# Patient Record
Sex: MALE | Race: BLACK OR AFRICAN AMERICAN | Employment: FULL TIME | URBAN - NONMETROPOLITAN AREA
[De-identification: names, ages, dates, MRNs, and addresses within clinical notes are randomized per-mention and may not be internally consistent; named-entity substitution may affect disease eponyms.]

---

## 2018-11-19 ENCOUNTER — OFFICE VISIT (OUTPATIENT)
Dept: NEUROLOGY | Age: 33
End: 2018-11-19
Payer: COMMERCIAL

## 2018-11-19 VITALS
OXYGEN SATURATION: 97 % | SYSTOLIC BLOOD PRESSURE: 134 MMHG | HEIGHT: 71 IN | BODY MASS INDEX: 44.1 KG/M2 | WEIGHT: 315 LBS | HEART RATE: 82 BPM | DIASTOLIC BLOOD PRESSURE: 81 MMHG

## 2018-11-19 DIAGNOSIS — E66.01 MORBID OBESITY (HCC): ICD-10-CM

## 2018-11-19 DIAGNOSIS — M54.2 NECK PAIN: ICD-10-CM

## 2018-11-19 DIAGNOSIS — R20.2 PARESTHESIA OF BOTH HANDS: Primary | ICD-10-CM

## 2018-11-19 PROCEDURE — 99204 OFFICE O/P NEW MOD 45 MIN: CPT | Performed by: PSYCHIATRY & NEUROLOGY

## 2018-11-19 RX ORDER — CYCLOBENZAPRINE HCL 10 MG
10 TABLET ORAL NIGHTLY
COMMUNITY
Start: 2018-10-18

## 2018-11-19 RX ORDER — ATENOLOL 50 MG/1
50 TABLET ORAL DAILY
COMMUNITY
Start: 2018-09-24

## 2018-11-19 RX ORDER — NAPROXEN 500 MG/1
500 TABLET ORAL DAILY PRN
COMMUNITY
Start: 2018-09-24
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
## 2018-12-11 ENCOUNTER — HOSPITAL ENCOUNTER (OUTPATIENT)
Dept: NEUROLOGY | Age: 33
Discharge: HOME | End: 2018-12-11
Payer: COMMERCIAL

## 2018-12-11 DIAGNOSIS — R20.2 PARESTHESIA OF BOTH HANDS: ICD-10-CM

## 2018-12-11 PROCEDURE — 95911 NRV CNDJ TEST 9-10 STUDIES: CPT | Performed by: PSYCHIATRY & NEUROLOGY

## 2018-12-11 PROCEDURE — 95886 MUSC TEST DONE W/N TEST COMP: CPT

## 2018-12-11 PROCEDURE — 95911 NRV CNDJ TEST 9-10 STUDIES: CPT

## 2018-12-11 PROCEDURE — 95886 MUSC TEST DONE W/N TEST COMP: CPT | Performed by: PSYCHIATRY & NEUROLOGY

## 2019-01-07 ENCOUNTER — TELEPHONE (OUTPATIENT)
Dept: NEUROLOGY | Age: 34
End: 2019-01-07

## 2019-01-10 DIAGNOSIS — G56.03 BILATERAL CARPAL TUNNEL SYNDROME: Primary | ICD-10-CM

## 2019-01-18 ENCOUNTER — TRANSCRIBE ORDERS (OUTPATIENT)
Dept: ORTHOPEDIC SURGERY | Facility: CLINIC | Age: 34
End: 2019-01-18

## 2019-01-18 DIAGNOSIS — G56.03 BILATERAL CARPAL TUNNEL SYNDROME: Primary | ICD-10-CM

## 2019-01-22 DIAGNOSIS — M79.641 BILATERAL HAND PAIN: Primary | ICD-10-CM

## 2019-01-22 DIAGNOSIS — M79.642 BILATERAL HAND PAIN: Primary | ICD-10-CM

## 2019-01-23 ENCOUNTER — OFFICE VISIT (OUTPATIENT)
Dept: ORTHOPEDIC SURGERY | Facility: CLINIC | Age: 34
End: 2019-01-23

## 2019-01-23 VITALS — HEIGHT: 73 IN | BODY MASS INDEX: 41.75 KG/M2 | WEIGHT: 315 LBS

## 2019-01-23 DIAGNOSIS — G56.03 CARPAL TUNNEL SYNDROME, BILATERAL: ICD-10-CM

## 2019-01-23 DIAGNOSIS — M79.642 BILATERAL HAND PAIN: Primary | ICD-10-CM

## 2019-01-23 DIAGNOSIS — M79.641 BILATERAL HAND PAIN: Primary | ICD-10-CM

## 2019-01-23 PROCEDURE — 99203 OFFICE O/P NEW LOW 30 MIN: CPT | Performed by: ORTHOPAEDIC SURGERY

## 2019-01-23 RX ORDER — BUPIVACAINE HCL/0.9 % NACL/PF 0.1 %
2 PLASTIC BAG, INJECTION (ML) EPIDURAL ONCE
Status: CANCELLED | OUTPATIENT
Start: 2019-02-14 | End: 2019-01-23

## 2019-02-05 ENCOUNTER — APPOINTMENT (OUTPATIENT)
Dept: PREADMISSION TESTING | Facility: HOSPITAL | Age: 34
End: 2019-02-05

## 2019-02-05 VITALS
RESPIRATION RATE: 20 BRPM | HEIGHT: 71 IN | BODY MASS INDEX: 44.1 KG/M2 | SYSTOLIC BLOOD PRESSURE: 141 MMHG | WEIGHT: 315 LBS | DIASTOLIC BLOOD PRESSURE: 75 MMHG | HEART RATE: 70 BPM | OXYGEN SATURATION: 97 %

## 2019-02-05 PROCEDURE — 93010 ELECTROCARDIOGRAM REPORT: CPT | Performed by: INTERNAL MEDICINE

## 2019-02-05 PROCEDURE — 93005 ELECTROCARDIOGRAM TRACING: CPT

## 2019-02-05 RX ORDER — SODIUM CHLORIDE, SODIUM GLUCONATE, SODIUM ACETATE, POTASSIUM CHLORIDE, AND MAGNESIUM CHLORIDE 526; 502; 368; 37; 30 MG/100ML; MG/100ML; MG/100ML; MG/100ML; MG/100ML
1000 INJECTION, SOLUTION INTRAVENOUS CONTINUOUS
Status: CANCELLED | OUTPATIENT
Start: 2019-02-14

## 2019-02-05 RX ORDER — ATENOLOL 50 MG/1
50 TABLET ORAL DAILY
COMMUNITY

## 2019-02-05 NOTE — DISCHARGE INSTRUCTIONS
[facility]  Pre-op Information and Guidelines    You will be called after 2 p.m. the day before your surgery (Friday for Monday surgery) and notified of your time for arrival and approximate surgery time.  If you have not received a call by 4P.M., please contact Same Day Surgery at [phone] of if outside [facility] call [phone].    Please Follow these Important Safety Guidelines:    • The morning of your procedure, take only the medications listed below with   A sip of water:_____________________________________________       _________ATENOLOL__________________________    • DO NOT eat or drink anything after 12:00 midnight the night before surgery  Specific instructions concerning drinking clear liquids will be discussed during  the pre-surgery instruction call the day before your surgery.    • If you take a blood thinner (ex. Plavix, Coumadin, aspirin), ask your doctor when to stop it before surgery  STOP DATE: _________________    • Only 2 visitors are allowed in patient rooms at a time  Your visitors will be asked to wait in the lobby until the admission process is complete with the exception of a parent with a child and patients in need of special assistance.    • YOU CANNOT DRIVE YOURSELF HOME  You must be accompanied by someone who will be responsible for driving you home after surgery and for your care at home.    • DO NOT chew gum, use breath mints, hard candy, or smoke the day of surgery  • DO NOT drink alcohol for at least 24 hours before your surgery  • DO NOT wear any jewelry and remove all body piercing before coming to the hospital  • DO NOT wear make-up to the hospital  • If you are having surgery on an extremity (arm/leg/foot) remove nail polish/artificial nails on the surgical side  • Clothing, glasses, contacts, dentures, and hairpieces must be removed before surgery  • Bathe the night before or the morning of your surgery and do not use powders/lotions on  skin.

## 2019-02-13 ENCOUNTER — ANESTHESIA EVENT (OUTPATIENT)
Dept: PERIOP | Facility: HOSPITAL | Age: 34
End: 2019-02-13

## 2019-02-14 ENCOUNTER — ANESTHESIA (OUTPATIENT)
Dept: PERIOP | Facility: HOSPITAL | Age: 34
End: 2019-02-14

## 2019-02-14 ENCOUNTER — HOSPITAL ENCOUNTER (OUTPATIENT)
Facility: HOSPITAL | Age: 34
Setting detail: HOSPITAL OUTPATIENT SURGERY
Discharge: HOME | End: 2019-02-14
Attending: ORTHOPAEDIC SURGERY | Admitting: ORTHOPAEDIC SURGERY

## 2019-02-14 VITALS
DIASTOLIC BLOOD PRESSURE: 69 MMHG | TEMPERATURE: 98.1 F | OXYGEN SATURATION: 99 % | WEIGHT: 315 LBS | SYSTOLIC BLOOD PRESSURE: 123 MMHG | RESPIRATION RATE: 18 BRPM | BODY MASS INDEX: 44.1 KG/M2 | HEIGHT: 71 IN | HEART RATE: 63 BPM

## 2019-02-14 DIAGNOSIS — G56.03 CARPAL TUNNEL SYNDROME, BILATERAL: ICD-10-CM

## 2019-02-14 PROCEDURE — 64721 CARPAL TUNNEL SURGERY: CPT | Performed by: ORTHOPAEDIC SURGERY

## 2019-02-14 RX ORDER — 0.9 % SODIUM CHLORIDE 0.9 %
VIAL (ML) INJECTION AS NEEDED
Status: DISCONTINUED | OUTPATIENT
Start: 2019-02-14 | End: 2019-02-14 | Stop reason: HOSPADM

## 2019-02-14 RX ORDER — BUPIVACAINE HCL/0.9 % NACL/PF 0.1 %
2 PLASTIC BAG, INJECTION (ML) EPIDURAL ONCE
Status: COMPLETED | OUTPATIENT
Start: 2019-02-14 | End: 2019-02-14

## 2019-02-14 RX ORDER — BACITRACIN 50000 [IU]/1
INJECTION, POWDER, FOR SOLUTION INTRAMUSCULAR AS NEEDED
Status: DISCONTINUED | OUTPATIENT
Start: 2019-02-14 | End: 2019-02-14 | Stop reason: HOSPADM

## 2019-02-14 RX ORDER — SODIUM CHLORIDE, SODIUM GLUCONATE, SODIUM ACETATE, POTASSIUM CHLORIDE, AND MAGNESIUM CHLORIDE 526; 502; 368; 37; 30 MG/100ML; MG/100ML; MG/100ML; MG/100ML; MG/100ML
1000 INJECTION, SOLUTION INTRAVENOUS CONTINUOUS
Status: DISCONTINUED | OUTPATIENT
Start: 2019-02-14 | End: 2019-02-14 | Stop reason: HOSPADM

## 2019-02-14 RX ORDER — LIDOCAINE HYDROCHLORIDE 10 MG/ML
INJECTION, SOLUTION INFILTRATION; PERINEURAL AS NEEDED
Status: DISCONTINUED | OUTPATIENT
Start: 2019-02-14 | End: 2019-02-14 | Stop reason: HOSPADM

## 2019-02-14 RX ORDER — HYDROCODONE BITARTRATE AND ACETAMINOPHEN 7.5; 325 MG/1; MG/1
1-2 TABLET ORAL EVERY 4 HOURS PRN
Qty: 30 TABLET | Refills: 0 | Status: SHIPPED | OUTPATIENT
Start: 2019-02-14 | End: 2019-02-28

## 2019-02-14 RX ADMIN — Medication 2 G: at 12:48

## 2019-02-14 RX ADMIN — SODIUM CHLORIDE, SODIUM GLUCONATE, SODIUM ACETATE, POTASSIUM CHLORIDE, AND MAGNESIUM CHLORIDE 1000 ML: 526; 502; 368; 37; 30 INJECTION, SOLUTION INTRAVENOUS at 11:18

## 2019-02-14 NOTE — ANESTHESIA POSTPROCEDURE EVALUATION
Patient: Bautista Lugo    Procedure Summary     Date:  02/14/19 Room / Location:  [facility] OR 03 / [facility]    Anesthesia Start:  1242 Anesthesia Stop:  1319    Procedure:  CARPAL TUNNEL RELEASE (Right Wrist) Diagnosis:       Carpal tunnel syndrome, bilateral      (Carpal tunnel syndrome, bilateral [G56.03])    Surgeon:  Beau Delacruz MD Provider:  Rj Okeefe MD    Anesthesia Type:  other ASA Status:  4          Anesthesia Type: other  Last vitals  BP   133/69 (02/14/19 1109)   Temp   97.2 °F (36.2 °C) (02/14/19 1109)   Pulse   63 (02/14/19 1109)   Resp   18 (02/14/19 1109)     SpO2   99 % (02/14/19 1109)     Post Anesthesia Care and Evaluation    Patient location during evaluation: bedside  Patient participation: complete - patient participated  Level of consciousness: awake and alert  Pain score: 0  Pain management: adequate  Airway patency: patent  Anesthetic complications: No anesthetic complications  PONV Status: none  Cardiovascular status: acceptable  Respiratory status: acceptable  Hydration status: acceptable

## 2019-02-14 NOTE — OP NOTE
Procedure(s):  CARPAL TUNNEL RELEASE  Procedure Note    Bautista Lugo  2/14/2019    Pre-op Diagnosis:   Carpal tunnel syndrome, bilateral [G56.03]    Post-op Diagnosis:     Post-Op Diagnosis Codes:     * Carpal tunnel syndrome, bilateral [G56.03]    Procedure/CPT® Codes:      Procedure(s):  CARPAL TUNNEL RELEASE right    Surgeon(s):  Beau Delacruz MD    Anesthesia: Local    Staff:   Circulator: Kaylin Acosta RN  Scrub Person: Ivy Walls  Assistant: Saniya Lynch MA    Estimated Blood Loss: minimal    Specimens:                None      Drains:      Findings:  carpal tunnel     Complications: None    Dictation: Indications: 32yo male with CTS diagnosed electro-diagnostically and clinically.  He is failed conservative treatment.  He is for release at this point risk benefits include but not limited to bleeding, infection, blood clot, failure to resolve his pain, neurovascular injury, need for further surgery, prolonged recovery, skin issues, medical anesthetic complications, etc. he understands detailed informed consent is given will good proceed as planned.    Procedure:  Procedure: Patient taken operating room the arm was prepped and draped usual sterile fashion timeout performed prior to procedure.    Area of the volar surface of the carpal canal was infiltrated 1% plain Xylocaine.  It was tested for numbness.  Tourniquet was inflated.  Sharp dissection carried to the skin down to the transverse carpal ligament with care to avoid neurovascular structures.  Transverse carpal ligament was identified and entered sharply in line with the fourth metacarpal ray.  A elevator was inserted to protect the canal contents.  This transverse carpal ligament was divided sharply in line with the fourth metacarpal ray.  Careful scissor dissection was carried proximally and distally to make sure this had been released adequately.  Nerve was somewhat erythematous the canal was palpated and noted to be free.   Tourniquet let down and bleeding points controlled with electrocautery.  Areas irrigated with copious amounts of saline solution.  Skin was enclosed 4-0 nylon interrupted fashion sterile dressings and a splint were applied she tolerated procedure well WERE correct    Beau Delacruz MD  02/14/19  1:59 PM

## 2019-02-14 NOTE — ANESTHESIA PREPROCEDURE EVALUATION
Anesthesia Evaluation     Patient summary reviewed and Nursing notes reviewed   no history of anesthetic complications:  NPO Solid Status: > 8 hours  NPO Liquid Status: > 8 hours           Airway   Mallampati: I  TM distance: >3 FB  Neck ROM: full  possible difficult intubation  Dental    (+) poor dentation    Pulmonary - normal exam    breath sounds clear to auscultation  (+) a smoker Former, sleep apnea on CPAP, decreased breath sounds,   Cardiovascular - normal exam    ECG reviewed  Patient on routine beta blocker and Beta blocker given within 24 hours of surgery  Rhythm: regular  Rate: normal    (+) hypertension,   (-) murmur    ROS comment: Normal sinus rhythm  Normal ECG  No previous ECGs available  Confirmed by WILLARD SHEPARD, B. N. ([id number]) on 2/6/2019 9:17:54 PM    Neuro/Psych  (+) numbness (Bilateral carpal tunnel syndrome.),     GI/Hepatic/Renal/Endo    (+) morbid obesity,      Musculoskeletal (-) negative ROS    Abdominal   (+) obese,    Substance History - negative use     OB/GYN negative ob/gyn ROS         Other - negative ROS                       Anesthesia Plan    ASA 4     other     Anesthetic plan, all risks, benefits, and alternatives have been provided, discussed and informed consent has been obtained with: patient and spouse/significant other.

## 2019-02-14 NOTE — INTERVAL H&P NOTE
H&P updated. The patient was examined and the following changes are noted:  Risks and benefits of been explained as above.  We are doing the right side carpal canal today.     Temp:  [97.2 °F (36.2 °C)] 97.2 °F (36.2 °C)  Heart Rate:  [63] 63  Resp:  [18] 18  BP: (133)/(69) 133/69    No Known Allergies    Prior to Admission medications    Medication Sig Start Date End Date Taking? Authorizing Provider   atenolol (TENORMIN) 50 MG tablet Take 50 mg by mouth Daily.   Yes Provider, MD Moon       Past Medical History:   Diagnosis Date   • Carpal tunnel syndrome    • Hypertension    • Sleep apnea     using bipap       Past Surgical History:   Procedure Laterality Date   • HERNIA REPAIR         Social History     Socioeconomic History   • Marital status: Single     Spouse name: Not on file   • Number of children: Not on file   • Years of education: Not on file   • Highest education level: Not on file   Social Needs   • Financial resource strain: Not on file   • Food insecurity - worry: Not on file   • Food insecurity - inability: Not on file   • Transportation needs - medical: Not on file   • Transportation needs - non-medical: Not on file   Occupational History   • Not on file   Tobacco Use   • Smoking status: Former Smoker     Years: 6.00     Last attempt to quit: 2019     Years since quittin.1   • Smokeless tobacco: Never Used   Substance and Sexual Activity   • Alcohol use: Yes     Comment: occasionally   • Drug use: No   • Sexual activity: Defer   Other Topics Concern   • Not on file   Social History Narrative   • Not on file       History reviewed. No pertinent family history.      Carpal tunnel syndrome, bilateral

## 2019-02-21 ENCOUNTER — OFFICE VISIT (OUTPATIENT)
Dept: ORTHOPEDIC SURGERY | Facility: CLINIC | Age: 34
End: 2019-02-21

## 2019-02-21 VITALS — WEIGHT: 315 LBS | BODY MASS INDEX: 44.1 KG/M2 | HEIGHT: 71 IN

## 2019-02-21 DIAGNOSIS — M79.641 BILATERAL HAND PAIN: Primary | ICD-10-CM

## 2019-02-21 DIAGNOSIS — M79.642 BILATERAL HAND PAIN: Primary | ICD-10-CM

## 2019-02-21 DIAGNOSIS — G56.03 CARPAL TUNNEL SYNDROME, BILATERAL: ICD-10-CM

## 2019-02-21 PROCEDURE — 99024 POSTOP FOLLOW-UP VISIT: CPT | Performed by: ORTHOPAEDIC SURGERY

## 2019-02-21 NOTE — PROGRESS NOTES
Bautista Lugo is a 33 y.o. male is s/p       Chief Complaint   Patient presents with   • Right Wrist - Post-op   • Suture / Staple Removal       HISTORY OF PRESENT ILLNESS:   POST Op  Right carpal tunnel release  2/14/19      No Known Allergies      Current Outpatient Medications:   •  atenolol (TENORMIN) 50 MG tablet, Take 50 mg by mouth Daily., Disp: , Rfl:   •  HYDROcodone-acetaminophen (NORCO) 7.5-325 MG per tablet, Take 1-2 tablets by mouth Every 4 (Four) Hours As Needed for Moderate Pain  (Pain)., Disp: 30 tablet, Rfl: 0    No fevers or chills.  No nausea or vomiting.      PHYSICAL EXAMINATION:       Bautista Lugo is a 33 y.o. male    Patient is awake and alert, answers questions appropriately and is in no apparent distress.    GAIT:     [x]  Normal  []  Antalgic    Assistive device: [x]  None  []  Walker     []  Crutches  []  Cane     []  Wheelchair  []  Stretcher    Ortho Exam  Wound looks good.  Moving his hand well.  Numbness is improved.    Xr Hand 3+ View Bilateral    Result Date: 1/24/2019  Narrative: 3 views bilateral hand Comparison none 3 views are seen no acute features are noted.  Bone mineralization is intact.  Joint space is well-maintained Person: Negative bilateral hand series           ASSESSMENT:    Diagnoses and all orders for this visit:    Bilateral hand pain    Carpal tunnel syndrome, bilateral          PLAN splint removal range of motion.  Follow-up 1 week for suture removal.    No Follow-up on file.    Beau Delacruz MD

## 2019-02-28 ENCOUNTER — OFFICE VISIT (OUTPATIENT)
Dept: ORTHOPEDIC SURGERY | Facility: CLINIC | Age: 34
End: 2019-02-28

## 2019-02-28 VITALS — HEIGHT: 71 IN | WEIGHT: 315 LBS | BODY MASS INDEX: 44.1 KG/M2

## 2019-02-28 DIAGNOSIS — Z98.890 S/P CARPAL TUNNEL RELEASE: Primary | ICD-10-CM

## 2019-02-28 PROCEDURE — 99024 POSTOP FOLLOW-UP VISIT: CPT | Performed by: ORTHOPAEDIC SURGERY

## 2019-02-28 RX ORDER — BUPIVACAINE HCL/0.9 % NACL/PF 0.1 %
2 PLASTIC BAG, INJECTION (ML) EPIDURAL ONCE
Status: CANCELLED | OUTPATIENT
Start: 2019-03-08 | End: 2019-02-28

## 2019-02-28 NOTE — PROGRESS NOTES
Bautista Luog is a 33 y.o. male is s/p  Right Carpal Tunnel Release.     Chief Complaint   Patient presents with   • Right Hand - Follow-up       HISTORY OF PRESENT ILLNESS: Patient is here today for recheck of right hand. Patient had right CTR on 2/14/2019. Patient states that his pain is 2/10 every now and then.        No Known Allergies      Current Outpatient Medications:   •  atenolol (TENORMIN) 50 MG tablet, Take 50 mg by mouth Daily., Disp: , Rfl:         PHYSICAL EXAMINATION:       Bautista Lugo is a 33 y.o. male    Patient is awake and alert, answers questions appropriately and is in no apparent distress.    GAIT:     [x]  Normal  []  Antalgic    Assistive device: []  None  []  Walker     []  Crutches  []  Cane     []  Wheelchair  []  Stretcher    Ortho Exam  Wound looks good.  No sign of infection.  He is able to make a fist well.    No results found.        ASSESSMENT:    Diagnoses and all orders for this visit:    S/P carpal tunnel release          PLAN doing very well at this point.  We will take the stitches out scar massage.  He wants to go ahead and get his left hand done we will get tomorrow to do this.  The risks and benefits of the same on that side for he is still symptomatic.  These include but not limited to bleeding, blood clot, infection, need for further surgery, neurovascular injury, infection, risk of wound problems, medical anesthetic problems etc. he understands we will proceed as planned with the left side carpal tunnel release                This document has been electronically signed by Saniya Lynch MA on February 28, 2019 8:13 AM

## 2019-03-05 ENCOUNTER — APPOINTMENT (OUTPATIENT)
Dept: PREADMISSION TESTING | Facility: HOSPITAL | Age: 34
End: 2019-03-05

## 2019-03-05 RX ORDER — SODIUM CHLORIDE, SODIUM GLUCONATE, SODIUM ACETATE, POTASSIUM CHLORIDE, AND MAGNESIUM CHLORIDE 526; 502; 368; 37; 30 MG/100ML; MG/100ML; MG/100ML; MG/100ML; MG/100ML
1000 INJECTION, SOLUTION INTRAVENOUS CONTINUOUS
Status: CANCELLED | OUTPATIENT
Start: 2019-03-08

## 2019-03-07 ENCOUNTER — ANESTHESIA EVENT (OUTPATIENT)
Dept: PERIOP | Facility: HOSPITAL | Age: 34
End: 2019-03-07

## 2019-03-08 ENCOUNTER — ANESTHESIA (OUTPATIENT)
Dept: PERIOP | Facility: HOSPITAL | Age: 34
End: 2019-03-08

## 2019-03-08 ENCOUNTER — HOSPITAL ENCOUNTER (OUTPATIENT)
Facility: HOSPITAL | Age: 34
Setting detail: HOSPITAL OUTPATIENT SURGERY
Discharge: HOME | End: 2019-03-08
Attending: ORTHOPAEDIC SURGERY | Admitting: ORTHOPAEDIC SURGERY

## 2019-03-08 VITALS
HEIGHT: 71 IN | OXYGEN SATURATION: 96 % | RESPIRATION RATE: 20 BRPM | WEIGHT: 315 LBS | TEMPERATURE: 97.2 F | SYSTOLIC BLOOD PRESSURE: 113 MMHG | BODY MASS INDEX: 44.1 KG/M2 | HEART RATE: 65 BPM | DIASTOLIC BLOOD PRESSURE: 63 MMHG

## 2019-03-08 DIAGNOSIS — Z98.890 S/P CARPAL TUNNEL RELEASE: ICD-10-CM

## 2019-03-08 PROCEDURE — 64721 CARPAL TUNNEL SURGERY: CPT | Performed by: ORTHOPAEDIC SURGERY

## 2019-03-08 RX ORDER — LIDOCAINE HYDROCHLORIDE 10 MG/ML
INJECTION, SOLUTION INFILTRATION; PERINEURAL AS NEEDED
Status: DISCONTINUED | OUTPATIENT
Start: 2019-03-08 | End: 2019-03-08 | Stop reason: HOSPADM

## 2019-03-08 RX ORDER — BACITRACIN 50000 [IU]/1
INJECTION, POWDER, FOR SOLUTION INTRAMUSCULAR AS NEEDED
Status: DISCONTINUED | OUTPATIENT
Start: 2019-03-08 | End: 2019-03-08 | Stop reason: HOSPADM

## 2019-03-08 RX ORDER — BUPIVACAINE HCL/0.9 % NACL/PF 0.1 %
2 PLASTIC BAG, INJECTION (ML) EPIDURAL ONCE
Status: COMPLETED | OUTPATIENT
Start: 2019-03-08 | End: 2019-03-08

## 2019-03-08 RX ORDER — SODIUM CHLORIDE, SODIUM GLUCONATE, SODIUM ACETATE, POTASSIUM CHLORIDE, AND MAGNESIUM CHLORIDE 526; 502; 368; 37; 30 MG/100ML; MG/100ML; MG/100ML; MG/100ML; MG/100ML
1000 INJECTION, SOLUTION INTRAVENOUS CONTINUOUS
Status: DISCONTINUED | OUTPATIENT
Start: 2019-03-08 | End: 2019-03-08 | Stop reason: HOSPADM

## 2019-03-08 RX ORDER — 0.9 % SODIUM CHLORIDE 0.9 %
VIAL (ML) INJECTION AS NEEDED
Status: DISCONTINUED | OUTPATIENT
Start: 2019-03-08 | End: 2019-03-08 | Stop reason: HOSPADM

## 2019-03-08 RX ORDER — HYDROCODONE BITARTRATE AND ACETAMINOPHEN 7.5; 325 MG/1; MG/1
1-2 TABLET ORAL EVERY 4 HOURS PRN
Qty: 30 TABLET | Refills: 0 | Status: SHIPPED | OUTPATIENT
Start: 2019-03-08 | End: 2019-04-04

## 2019-03-08 RX ORDER — MAGNESIUM HYDROXIDE 1200 MG/15ML
LIQUID ORAL AS NEEDED
Status: DISCONTINUED | OUTPATIENT
Start: 2019-03-08 | End: 2019-03-08 | Stop reason: HOSPADM

## 2019-03-08 RX ADMIN — SODIUM CHLORIDE, SODIUM GLUCONATE, SODIUM ACETATE, POTASSIUM CHLORIDE, AND MAGNESIUM CHLORIDE 1000 ML: 526; 502; 368; 37; 30 INJECTION, SOLUTION INTRAVENOUS at 06:30

## 2019-03-08 RX ADMIN — Medication 2 G: at 07:43

## 2019-03-08 NOTE — INTERVAL H&P NOTE
H&P updated. The patient was examined and the following changes are noted:  He is for left carpal tunnel release at this point.  And benefits of been explained to him somewhat to the side including but not limited to bleeding, blood clot, infection, neurovascular injury, need for further surgery, failure to resolve his pain, medical anesthetic problems, also his review of systems is updated as of today's date except for numbness, weakness, loss of motion all other systems are negative     Temp:  [97.4 °F (36.3 °C)] 97.4 °F (36.3 °C)  Heart Rate:  [65] 65  Resp:  [18] 18  BP: (128)/(70) 128/70    No Known Allergies    Prior to Admission medications    Medication Sig Start Date End Date Taking? Authorizing Provider   atenolol (TENORMIN) 50 MG tablet Take 50 mg by mouth Daily.   Yes Provider, MD Moon       Past Medical History:   Diagnosis Date   • Carpal tunnel syndrome    • Hypertension    • Sleep apnea     using bipap       Past Surgical History:   Procedure Laterality Date   • CARPAL TUNNEL RELEASE Right 2019    Procedure: CARPAL TUNNEL RELEASE;  Surgeon: Beau Delacruz MD;  Location: [facility];  Service: Orthopedics   • HERNIA REPAIR         Social History     Socioeconomic History   • Marital status: Single     Spouse name: Not on file   • Number of children: Not on file   • Years of education: Not on file   • Highest education level: Not on file   Social Needs   • Financial resource strain: Not on file   • Food insecurity - worry: Not on file   • Food insecurity - inability: Not on file   • Transportation needs - medical: Not on file   • Transportation needs - non-medical: Not on file   Occupational History   • Not on file   Tobacco Use   • Smoking status: Former Smoker     Years: 6.00     Last attempt to quit: 2019     Years since quittin.1   • Smokeless tobacco: Never Used   Substance and Sexual Activity   • Alcohol use: Yes     Comment: occasionally   • Drug use: No   • Sexual  activity: Defer   Other Topics Concern   • Not on file   Social History Narrative   • Not on file       History reviewed. No pertinent family history.      S/P carpal tunnel release

## 2019-03-08 NOTE — ANESTHESIA POSTPROCEDURE EVALUATION
Patient: Bautista Lugo    Procedure Summary     Date:  03/08/19 Room / Location:  [facility] OR  / [facility] OR    Anesthesia Start:  0740 Anesthesia Stop:  0813    Procedure:  CARPAL TUNNEL RELEASE (Left Wrist) Diagnosis:       S/P carpal tunnel release      (S/P carpal tunnel release [Z98.890])    Surgeon:  Beau Delacruz MD Provider:  Mariano Yoon MD    Anesthesia Type:  other, MAC ASA Status:  4          Anesthesia Type: other, MAC  Last vitals  BP   128/70 (03/08/19 0622)   Temp   97.4 °F (36.3 °C) (03/08/19 0622)   Pulse   65 (03/08/19 0622)   Resp   18 (03/08/19 0622)     SpO2   97 % (03/08/19 0622)     Post Anesthesia Care and Evaluation    Patient location during evaluation: bedside  Patient participation: complete - patient participated  Level of consciousness: awake and alert  Pain score: 0  Pain management: adequate  Airway patency: patent  Anesthetic complications: No anesthetic complications  PONV Status: none  Cardiovascular status: acceptable and hemodynamically stable  Respiratory status: acceptable and spontaneous ventilation  Hydration status: acceptable

## 2019-03-08 NOTE — PROGRESS NOTES
The second interval performed at 725 was done in error.  The first 1 done at 7:22 AM is correct.  There is no any epic system to addend or delete the second 1 to my knowledge.  Again the first interval H&P on Mr. Bautista Lugo started March 8 at 722 8 mm and filed at 7:23 AM is correct

## 2019-03-08 NOTE — ANESTHESIA PREPROCEDURE EVALUATION
Anesthesia Evaluation     Patient summary reviewed and Nursing notes reviewed   no history of anesthetic complications:  NPO Solid Status: > 8 hours  NPO Liquid Status: > 8 hours           Airway   Mallampati: I  TM distance: >3 FB  Neck ROM: full  possible difficult intubation  Dental    (+) poor dentation    Pulmonary - normal exam    breath sounds clear to auscultation  (+) a smoker Former, sleep apnea on CPAP, decreased breath sounds,   Cardiovascular - normal exam    ECG reviewed  Patient on routine beta blocker and Beta blocker given within 24 hours of surgery  Rhythm: regular  Rate: normal    (+) hypertension,   (-) murmur    ROS comment: Normal sinus rhythm  Normal ECG  No previous ECGs available  Confirmed by WILLARD SHEPARD, B. N. ([id number]) on 2/6/2019 9:17:54 PM    Neuro/Psych  (+) numbness (Bilateral carpal tunnel syndrome.),     GI/Hepatic/Renal/Endo    (+) morbid obesity,      Musculoskeletal (-) negative ROS    Abdominal   (+) obese,    Substance History - negative use     OB/GYN negative ob/gyn ROS         Other - negative ROS                         Anesthesia Plan    ASA 4     other and MAC   (Previous right carpal tunnel week ago with MAC/local. Desires the same today.)  intravenous induction   Anesthetic plan, all risks, benefits, and alternatives have been provided, discussed and informed consent has been obtained with: patient and spouse/significant other.

## 2019-03-08 NOTE — OP NOTE
Procedure(s):  CARPAL TUNNEL RELEASE  Procedure Note    Bautista Lugo  3/8/2019    Pre-op Diagnosis:   S/P carpal tunnel release [Z98.890]    Post-op Diagnosis:     Post-Op Diagnosis Codes:     * S/P carpal tunnel release [Z98.890]    Procedure/CPT® Codes:      Procedure(s):  CARPAL TUNNEL RELEASE left    Surgeon(s):  Beau Delacruz MD    Anesthesia: Local    Staff:   Circulator: Kaylin Acosta RN  Scrub Person: Radha Sevilla  Assistant: Saniya Lynch MA    Estimated Blood Loss: minimal    Specimens:                None      Drains:      Findings: Carpal tunnel     Complications: None    Dictation: Indications: 33-year-old status post carpal tunnel the opposite side.  He has symptoms bilaterally.  He is for release of the left side at this time is out of the right side done and was very pleased with his results.  He is for left side release at this point.  Risk and benefits explained to him in great detail as in the updated H&P is up today's date detailed informed consent is given.    Procedure:   Procedure: Patient taken operating room the arm was prepped and draped usual sterile fashion timeout performed prior to procedure.    Area of the volar surface of the carpal canal was infiltrated 1% plain Xylocaine.  It was tested for numbness.  Tourniquet was inflated.  Sharp dissection carried to the skin down to the transverse carpal ligament with care to avoid neurovascular structures.  Transverse carpal ligament was identified and entered sharply in line with the fourth metacarpal ray.  A elevator was inserted to protect the canal contents.  This transverse carpal ligament was divided sharply in line with the fourth metacarpal ray.  Careful scissor dissection was carried proximally and distally to make sure this had been released adequately.  Nerve was somewhat erythematous the canal was palpated and noted to be free.  Tourniquet let down and bleeding points controlled with electrocautery.   Areas irrigated with copious amounts of saline solution.  Skin was enclosed 4-0 nylon interrupted fashion sterile dressings and a splint were applied she tolerated procedure well WERE correct    Beau Delacruz MD  03/08/19  8:12 AM

## 2019-03-21 ENCOUNTER — OFFICE VISIT (OUTPATIENT)
Dept: ORTHOPEDIC SURGERY | Facility: CLINIC | Age: 34
End: 2019-03-21

## 2019-03-21 VITALS — BODY MASS INDEX: 44.1 KG/M2 | HEIGHT: 71 IN | WEIGHT: 315 LBS

## 2019-03-21 DIAGNOSIS — Z98.890 S/P CARPAL TUNNEL RELEASE: Primary | ICD-10-CM

## 2019-03-21 DIAGNOSIS — M79.642 BILATERAL HAND PAIN: ICD-10-CM

## 2019-03-21 DIAGNOSIS — G56.03 CARPAL TUNNEL SYNDROME, BILATERAL: ICD-10-CM

## 2019-03-21 DIAGNOSIS — M79.641 BILATERAL HAND PAIN: ICD-10-CM

## 2019-03-21 PROCEDURE — 99024 POSTOP FOLLOW-UP VISIT: CPT | Performed by: ORTHOPAEDIC SURGERY

## 2019-03-21 NOTE — PROGRESS NOTES
Bautista Lugo is a 33 y.o. male is s/p       Chief Complaint   Patient presents with   • Left Wrist - Post-op       HISTORY OF PRESENT ILLNESS: post op f/u on left wrist, suture removal,        No Known Allergies      Current Outpatient Medications:   •  atenolol (TENORMIN) 50 MG tablet, Take 50 mg by mouth Daily., Disp: , Rfl:   •  HYDROcodone-acetaminophen (NORCO) 7.5-325 MG per tablet, Take 1-2 tablets by mouth Every 4 (Four) Hours As Needed for Moderate Pain  (Pain)., Disp: 30 tablet, Rfl: 0        PHYSICAL EXAMINATION:       Bautista Lugo is a 33 y.o. male    Patient is awake and alert, answers questions appropriately and is in no apparent distress.    GAIT:     []  Normal  []  Antalgic    Assistive device: []  None  []  Walker     []  Crutches  []  Cane     []  Wheelchair  []  Stretcher    Ortho Exam  Wound looks good.  Good sensation.  Feels better no sign of infection  No results found.        ASSESSMENT:    Diagnoses and all orders for this visit:    S/P carpal tunnel release    Bilateral hand pain    Carpal tunnel syndrome, bilateral          PLAN suture removal, scar massage.  Doing very well with both sides.  He will call with any problems we will see him back as needed he has a good understanding of the aftercare for his carpal tunnel surgery.  Will call with any changes.                This document has been electronically signed by Beau Delacruz MD on March 21, 2019 9:56 AM

## 2019-03-26 ENCOUNTER — TELEPHONE (OUTPATIENT)
Dept: ORTHOPEDIC SURGERY | Facility: CLINIC | Age: 34
End: 2019-03-26

## 2019-03-26 NOTE — TELEPHONE ENCOUNTER
JULIANN Krishnamurthy CALLED AND WAS WONDERING IF HE NEEDED A FOLLOW UP TO SEE YOU..  HE WAS ALSO WONDERING WHEN YOU THOUGHT HE WOULD BE RELEASED...

## 2019-03-27 NOTE — TELEPHONE ENCOUNTER
Called patient to let him know that he can be released when he feels he is ready and to follow up as needed per Dr. Delacruz.

## 2019-04-04 ENCOUNTER — OFFICE VISIT (OUTPATIENT)
Dept: ORTHOPEDIC SURGERY | Facility: CLINIC | Age: 34
End: 2019-04-04

## 2019-04-04 VITALS — HEIGHT: 71 IN | WEIGHT: 315 LBS | BODY MASS INDEX: 44.1 KG/M2

## 2019-04-04 DIAGNOSIS — Z98.890 S/P CARPAL TUNNEL RELEASE: Primary | ICD-10-CM

## 2019-04-04 PROCEDURE — 99024 POSTOP FOLLOW-UP VISIT: CPT | Performed by: ORTHOPAEDIC SURGERY

## 2019-04-04 NOTE — PROGRESS NOTES
Bautista Lugo is a 33 y.o. male is s/p  Left Carpal Tunnel Release.     Chief Complaint   Patient presents with   • Follow-up     Left CTR       HISTORY OF PRESENT ILLNESS: Patient is here today for recheck of left CTR done on 3/8/2019. Patient states that he not having any issue with pain today.       No Known Allergies      Current Outpatient Medications:   •  atenolol (TENORMIN) 50 MG tablet, Take 50 mg by mouth Daily., Disp: , Rfl:         PHYSICAL EXAMINATION:       Bautista Lugo is a 33 y.o. male    Patient is awake and alert, answers questions appropriately and is in no apparent distress.    GAIT:     []  Normal  []  Antalgic    Assistive device: []  None  []  Walker     []  Crutches  []  Cane     []  Wheelchair  []  Stretcher    Ortho Exam  Wound looks good.  Neurologically intact.    No results found.        ASSESSMENT:    Diagnoses and all orders for this visit:    S/P carpal tunnel release          PLAN doing well.  I let him go back to work.  He got some impact gloves.  We will see with any issues whatsoever                This document has been electronically signed by Saniya Lynch MA on April 4, 2019 8:02 AM

## 2021-05-10 ENCOUNTER — TRANSCRIBE ORDERS (OUTPATIENT)
Dept: PHYSICAL THERAPY | Facility: CLINIC | Age: 36
End: 2021-05-10

## 2021-05-10 DIAGNOSIS — M70.21 OLECRANON BURSITIS OF RIGHT ELBOW: Primary | ICD-10-CM

## 2021-05-11 ENCOUNTER — TREATMENT (OUTPATIENT)
Dept: PHYSICAL THERAPY | Facility: CLINIC | Age: 36
End: 2021-05-11

## 2021-05-11 DIAGNOSIS — M25.521 RIGHT ELBOW PAIN: ICD-10-CM

## 2021-05-11 DIAGNOSIS — M70.21 OLECRANON BURSITIS OF RIGHT ELBOW: Primary | ICD-10-CM

## 2021-05-11 PROCEDURE — 97162 PT EVAL MOD COMPLEX 30 MIN: CPT | Performed by: PHYSICAL THERAPIST

## 2021-05-11 PROCEDURE — 97110 THERAPEUTIC EXERCISES: CPT | Performed by: PHYSICAL THERAPIST

## 2021-05-11 NOTE — PROGRESS NOTES
"  Physical Therapy Initial Evaluation and Plan of Care      Patient: Bautista Lugo   : 1985  Diagnosis/ICD-10 Code:  Olecranon bursitis of right elbow [M70.21]  Referring practitioner: NOLVIA Rico  Date of Initial Visit: 2021  Today's Date: 2021  Patient seen for 1 sessions    Recertification: 2021   Next MD appt: Early .         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 He reports he was in a sling until he could go without it on his own. He reports he just stopped wearing the sling.      Patient Occupation: [employer]   Precautions and Work Restrictions: Off work for about a monthPain  Current pain ratin  At best pain ratin  At worst pain ratin  Location: R elbow  Quality: tight, squeezing, discomfort, needle-like and radiating  Relieving factors: change in position  Aggravating factors: movement and repetitive movement  Progression: improved    Social Support  Lives with: young children and spouse    Hand dominance: right    Diagnostic Tests  X-ray: abnormal (\"Fracture per patient\")    Treatments  Current treatment: medication  Patient Goals  Patient goals for therapy: decreased edema, decreased pain, increased motion, increased strength, independence with ADLs/IADLs, return to sport/leisure activities and return to work             Objective          Static Posture     Head  Forward.    Shoulders  Rounded.    Observations     Right Elbow   Positive for edema.       Tenderness     Right Elbow   Tenderness in the olecranon process.     Right Wrist/Hand   Tenderness in the olecranon process.     Neurological Testing     Sensation "     Elbow   Left Elbow   Intact: light touch    Right Elbow   Intact: light touch  Paresthesia: light touch    Active Range of Motion     Left Elbow   Normal active range of motion  Flexion: 134 degrees   Extension: 0 degrees   Forearm supination: Left elbow active supination: ~80-90°     Right Elbow   Flexion: 120 degrees   Extension: 12 degrees   Forearm supination: 40 degrees   Forearm pronation: WFL    Strength/Myotome Testing     Left Elbow   Normal strength    Right Elbow   Flexion: 5  Extension: 4+    Left Wrist/Hand   Normal wrist strength     (2nd hand position)    Trial 1: 80 lbs    Trial 2: 80 lbs    Trial 3: 89 lbs    Average: 83 lbs    Right Wrist/Hand   Wrist extension: 5  Wrist flexion: 4+  Radial deviation: 5  Ulnar deviation: 5     (2nd hand position)     Trial 1: 42 lbs    Trial 2: 40 lbs    Trial 3: 43 lbs    Average: 41.67 lbs    Tests     Right Elbow   Negative valgus stress at 0 degrees, valgus stress at 30 degrees, varus stress at 0 degrees and varus stress at 30 degrees.     Ambulation     Comments   FWB, non-antalgic gait, no distress, no assistive device, no significant gait deviation, normal arm swing with gait.          Assessment & Plan     Assessment  Impairments: abnormal or restricted ROM, activity intolerance, impaired physical strength, lacks appropriate home exercise program and pain with function  Assessment details: Patient comes in s/p fall on R elbow with bursitis and patient reported fracture. Patient has some mild decrease in ROM and strength. He has localized edme and mild tenderness as well as some ulnar nerve tension and irritation. Patient did well with all HEP exercises today. Patient was given written copies of HEP.  Prognosis: fair  Prognosis details: Barriers to Rehab: Include significant or possible arthritic/degenerative changes that have occurred within the joint, The patient's obesity.    Safety Issues: None noted.    Functional Limitations: carrying  objects, pulling, pushing, uncomfortable because of pain and unable to perform repetitive tasks  Goals  Plan Goals: Short Term Goals:  1) I with HEP and have additions/changes by next re-certification.    2) AROM R elbow flexion>=130°.    3) AROM R elbow extension>=5°.    4) R wrist MMT flexion 5/5.    5) R  at the #2 setting >= 60#.    6) Patient to have supination for the R forearm >= 80°.      Long Term goals:  1) AROM for the R elbow 0->= 130°. no increase in pain.    2) B wrist 5/5, no increase in pain.    3) R tricep 5/5.    4) R  at the #2 setting >= 80#.    6) Patient able to perform 1 arm box carry of 20# for 3 minutes with no increase in pain or onset of s/s.    7) Patient able to perform pushing/pulling activities with no increase in pain or onset of s/s.    8) I with final HEP        Plan  Therapy options: will be seen for skilled physical therapy services  Planned modality interventions: cryotherapy and ultrasound  Planned therapy interventions: body mechanics training, fine motor coordination training, flexibility, functional ROM exercises, home exercise program, manual therapy, soft tissue mobilization, strengthening, stretching and therapeutic activities  Duration in visits: 12  Treatment plan discussed with: patient  Plan details: Progress overall ROM, strength, , desensitization, activity tolerance, and work specific activities.      Other therapeutic activities and/or exercises will be prescribed depending on the patients progress or lack there of.     Visit Diagnoses:    ICD-10-CM ICD-9-CM   1. Olecranon bursitis of right elbow  M70.21 726.33   2. Right elbow pain  M25.521 719.42       Timed:  Manual Therapy:         mins  02922;  Therapeutic Exercise:    25     mins  14849;      Neuromuscular Juan Miguel:        mins  04529;    Therapeutic Activity:          mins  22847;     Gait Training:           mins  50131;     Ultrasound:          mins  83765;    Paraffin:        mins   45274;  Electrical Stimulation:         mins  24105 ( );    Untimed:  Electrical Stimulation:         mins  40764 ( );  Mechanical Traction:         mins  78149;     Timed Treatment:   25   mins   Total Treatment:     60   mins    PT SIGNATURE: Rosa Bustos PT DPT, CSCS   DATE TREATMENT INITIATED: 5/11/2021    Initial Certification  Certification Period: 8/9/2021  I certify that the therapy services are furnished while this patient is under my care.  The services outlined above are required by this patient, and will be reviewed every 90 days.     PHYSICIAN: Perry Costello MD PhD      DATE:     Please sign and return via fax to  .. Thank you, [facility].        This document has been electronically signed by NICOLE EspañaT, CSCS on May 11, 2021 09:07 CDT

## 2021-05-13 ENCOUNTER — TREATMENT (OUTPATIENT)
Dept: PHYSICAL THERAPY | Facility: CLINIC | Age: 36
End: 2021-05-13

## 2021-05-13 DIAGNOSIS — M70.21 OLECRANON BURSITIS OF RIGHT ELBOW: Primary | ICD-10-CM

## 2021-05-13 DIAGNOSIS — M25.521 RIGHT ELBOW PAIN: ICD-10-CM

## 2021-05-13 PROCEDURE — 97110 THERAPEUTIC EXERCISES: CPT | Performed by: PHYSICAL THERAPIST

## 2021-05-13 NOTE — PROGRESS NOTES
Physical Therapy Daily Progress Note    Patient: Bautista Lugo   : 1985  Diagnosis/ICD-10 Code:     Diagnosis Plan   1. Olecranon bursitis of right elbow     2. Right elbow pain       Referring practitioner: NOLVIA Rico  Date of Initial Visit: Type: THERAPY  Noted: 2021  Today's Date: 2021  Patient seen for 2 sessions      PT Recheck Due: 2021  PT MD Visit: Early 2021       Bautista Lugo        Subjective Evaluation    History of Present Illness    Subjective comment: states that pain is about the same. about a 6-7/10. reall just depends on what he's doing.          Objective          Active Range of Motion     Right Elbow   Flexion: 123 degrees   Extension: 5 degrees       See Exercise, Manual, and Modality Logs for complete treatment.       Assessment & Plan     Assessment  Assessment details: Patient has improved elbow extension this date. Gives good effort throughout. No increase in complaints are verbalized.     Goals  Plan Goals: Short Term Goals:  1) I with HEP and have additions/changes by next re-certification. (met)    2) AROM R elbow flexion>=130°.    3) AROM R elbow extension>=5°.    4) R wrist MMT flexion 5/5.    5) R  at the #2 setting >= 60#.    6) Patient to have supination for the R forearm >= 80°.      Long Term goals:  1) AROM for the R elbow 0->= 130°. no increase in pain.    2) B wrist 5/5, no increase in pain.    3) R tricep 5/5.    4) R  at the #2 setting >= 80#.    6) Patient able to perform 1 arm box carry of 20# for 3 minutes with no increase in pain or onset of s/s.    7) Patient able to perform pushing/pulling activities with no increase in pain or onset of s/s.    8) I with final HEP    Plan  Plan details: Next visit tband wrist curls      Progress per Plan of Care and Progress strengthening /stabilization /functional activity            Timed:  Manual Therapy:         mins  79078;  Therapeutic Exercise:    45     mins  57518;    Aquatic Therex :        mins  64681    Neuromuscular Juan Miguel:        mins  55995;    Therapeutic Activity:          mins  07859;     Gait Training:           mins  18951;     Ultrasound:          mins  74306;    Electrical Stimulation:         mins  45210 ( );    Untimed:  Electrical Stimulation:         mins  17113 ( );  Mechanical Traction:         mins  08164;   Paraffin:         mins  86651;  Orthotic fit/train:         mins  38548  Iontophoresis:          mins  60739    Timed Treatment:   45   mins   Total Treatment:     45   mins  Ellie Feng PTA  Physical Therapist Assistant      This document has been electronically signed by Ellie Feng PTA on May 13, 2021 09:58 CDT

## 2021-05-17 ENCOUNTER — TREATMENT (OUTPATIENT)
Dept: PHYSICAL THERAPY | Facility: CLINIC | Age: 36
End: 2021-05-17

## 2021-05-17 DIAGNOSIS — M25.521 RIGHT ELBOW PAIN: ICD-10-CM

## 2021-05-17 DIAGNOSIS — M70.21 OLECRANON BURSITIS OF RIGHT ELBOW: Primary | ICD-10-CM

## 2021-05-17 PROCEDURE — 97110 THERAPEUTIC EXERCISES: CPT | Performed by: PHYSICAL THERAPIST

## 2021-05-17 NOTE — PROGRESS NOTES
Physical Therapy Daily Progress Note    Patient: Bautista Lugo   : 1985  Diagnosis/ICD-10 Code:     Diagnosis Plan   1. Olecranon bursitis of right elbow     2. Right elbow pain       Referring practitioner: NOLVIA Rico  Date of Initial Visit: Type: THERAPY  Noted: 2021  Today's Date: 2021  Patient seen for 3 sessions      PT Recheck Due: 2021  PT MD Visit: 2021       Bautista Lugo Subjective Evaluation    History of Present Illness    Subjective comment: Pt states his elbow feels about the same.  Pain is not terrible , but still painful.  States the elbow is less sensitive to the touch .  Reports pain to 5-6/10 at start of tx session. Pain  Current pain ratin             Objective   See Exercise, Manual, and Modality Logs for complete treatment.         Assessment & Plan     Assessment  Assessment details: Pt with good effort.  Does not report any increased pain with therex this date.  Pt had left putty in vehicle.  Reviewed use of putty for HEP and will bring next visit.  Pt issued resistance band and written handout for additions to HEP with verbal instruction.  Ice to go post tx.     Goals  Plan Goals: Short Term Goals:  1) I with HEP and have additions/changes by next re-certification. (met)    2) AROM R elbow flexion>=130°.    3) AROM R elbow extension>=5°.    4) R wrist MMT flexion 5/5.    5) R  at the #2 setting >= 60#.    6) Patient to have supination for the R forearm >= 80°.      Long Term goals:  1) AROM for the R elbow 0->= 130°. no increase in pain.    2) B wrist 5/5, no increase in pain.    3) R tricep 5/5.    4) R  at the #2 setting >= 80#.    6) Patient able to perform 1 arm box carry of 20# for 3 minutes with no increase in pain or onset of s/s.    7) Patient able to perform pushing/pulling activities with no increase in pain or onset of s/s.    8) I with final HEP    Plan  Plan details: Next visit assess  strength, add wringing with  Tbar.       Progress per Plan of Care and Progress strengthening /stabilization /functional activity            Timed:  Manual Therapy:         mins  08614;  Therapeutic Exercise:    45     mins  21525;   Aquatic Therex :        mins  01753    Neuromuscular Juan Miguel:        mins  19113;    Therapeutic Activity:          mins  19146;     Gait Training:           mins  75127;     Ultrasound:          mins  77619;    Electrical Stimulation:         mins  15656 ( );    Untimed:  Electrical Stimulation:         mins  56556 ( );  Mechanical Traction:         mins  07503;   Orthotic fit/train:         mins  52522    Timed Treatment:   45   mins   Total Treatment:     45   mins  Ghazala Martin PTA  Physical Therapist Assistant        This document has been electronically signed by Ghazala Martin PTA on May 17, 2021 11:27 CDT

## 2021-05-19 ENCOUNTER — TREATMENT (OUTPATIENT)
Dept: PHYSICAL THERAPY | Facility: CLINIC | Age: 36
End: 2021-05-19

## 2021-05-19 DIAGNOSIS — M70.21 OLECRANON BURSITIS OF RIGHT ELBOW: Primary | ICD-10-CM

## 2021-05-19 DIAGNOSIS — M25.521 RIGHT ELBOW PAIN: ICD-10-CM

## 2021-05-19 PROCEDURE — 97110 THERAPEUTIC EXERCISES: CPT | Performed by: PHYSICAL THERAPIST

## 2021-05-19 NOTE — PROGRESS NOTES
Physical Therapy Daily Progress Note    Patient: Bautista Lugo   : 1985  Diagnosis/ICD-10 Code:     Diagnosis Plan   1. Olecranon bursitis of right elbow     2. Right elbow pain       Referring practitioner: NOLVIA Rico  Date of Initial Visit: Type: THERAPY  Noted: 2021  Today's Date: 2021  Patient seen for 4 sessions      PT Recheck Due: 2021  PT MD Visit: 2021       Bautista Lugo       Subjective Evaluation    History of Present Illness    Subjective comment: Pt states the elbow is about the same.  Pain remain 5/10 at rest, 6-7/10 with touch/pressure to area. Pain  Current pain ratin             Objective          Strength/Myotome Testing     Right Wrist/Hand      (2nd hand position)     Trial 1: 58 lbs    Trial 2: 58 lbs    Trial 3: 40 lbs    Average: 52 lbs      See Exercise, Manual, and Modality Logs for complete treatment.       Assessment & Plan     Assessment  Assessment details: Pt with good effort.  Good recall of HEP.  Pt continues to report most pain directly over elbow with pocket of swelling visible.   strength increasing.  No pain reports with new therex.     Goals  Plan Goals: Short Term Goals:  1) I with HEP and have additions/changes by next re-certification. (met)    2) AROM R elbow flexion>=130°.    3) AROM R elbow extension>=5°.    4) R wrist MMT flexion 5/5.    5) R  at the #2 setting >= 60#.    6) Patient to have supination for the R forearm >= 80°.      Long Term goals:  1) AROM for the R elbow 0->= 130°. no increase in pain.    2) B wrist 5/5, no increase in pain.    3) R tricep 5/5.    4) R  at the #2 setting >= 80#.    6) Patient able to perform 1 arm box carry of 20# for 3 minutes with no increase in pain or onset of s/s.    7) Patient able to perform pushing/pulling activities with no increase in pain or onset of s/s.    8) I with final HEP    Plan  Plan details: Next visit progress wrist strengthening and hammer  weight       Progress per Plan of Care and Progress strengthening /stabilization /functional activity            Timed:  Manual Therapy:         mins  10130;  Therapeutic Exercise:    43     mins  72409;   Aquatic Therex :        mins  89666    Neuromuscular Juan Miguel:        mins  50663;    Therapeutic Activity:          mins  42415;     Gait Training:           mins  18803;     Ultrasound:          mins  05034;    Electrical Stimulation:         mins  63180 ( );    Untimed:  Electrical Stimulation:         mins  15534 ( );  Mechanical Traction:         mins  13918;   Orthotic fit/train:         mins  61185    Timed Treatment:   43   mins   Total Treatment:     53   mins  Ghazala Martin PTA  Physical Therapist Assistant        This document has been electronically signed by Ghazala Martin PTA on May 19, 2021 08:42 CDT

## 2021-05-25 ENCOUNTER — TREATMENT (OUTPATIENT)
Dept: PHYSICAL THERAPY | Facility: CLINIC | Age: 36
End: 2021-05-25

## 2021-05-25 DIAGNOSIS — M25.521 RIGHT ELBOW PAIN: ICD-10-CM

## 2021-05-25 DIAGNOSIS — M70.21 OLECRANON BURSITIS OF RIGHT ELBOW: Primary | ICD-10-CM

## 2021-05-25 PROCEDURE — 97110 THERAPEUTIC EXERCISES: CPT | Performed by: PHYSICAL THERAPIST

## 2021-05-25 NOTE — PROGRESS NOTES
Physical Therapy Daily Progress Note/Discharge     Patient: Bautista Lugo   : 1985  Diagnosis/ICD-10 Code:     Diagnosis Plan   1. Olecranon bursitis of right elbow     2. Right elbow pain       Referring practitioner: NOLVIA Rico  Date of Initial Visit: Type: THERAPY  Noted: 2021  Today's Date: 2021  Patient seen for 5 sessions      PT Recheck Due: 2021  PT MD Visit: 2021       Bautista Lugo reports: 70% improved           Subjective Evaluation    History of Present Illness    Subjective comment: Pt states his arm/elbow are feeling much better.  He states he is ready to return to work.  Sees MD on Friday. Pain  Current pain ratin             Objective          Active Range of Motion     Right Elbow   Flexion: 135 degrees WFL  Extension: 0 degrees WFL  Forearm supination: 45 degrees     Strength/Myotome Testing     Right Wrist/Hand      (2nd hand position)     Trial 1: 72 lbs    Trial 2: 84 lbs    Trial 3: 58 lbs    Average: 71.33 lbs      See Exercise, Manual, and Modality Logs for complete treatment.       Assessment & Plan     Assessment  Assessment details: Pt with good effort and technique with all stretches and therex.  Pt able to complete all tasks with little to no discomfort in his R arm/elbow.  Pt states the most uncomfortable position with the elbow is driving or sleeping .  Review current HEP and emphasized importance of continuing HEP after discharge from PT.  Pt request to cancel last PT appointment with hopes of being cleared to return to work.  DC with most goals met at this time.      Goals  Plan Goals: Short Term Goals:  1) I with HEP and have additions/changes by next re-certification. (met)    2) AROM R elbow flexion>=130°. (met)     3) AROM R elbow extension>=5°.(met)     4) R wrist MMT flexion 5/5. (met)     5) R  at the #2 setting >= 60#. (met)     6) Patient to have supination for the R forearm >= 80°. (partially met)       Long  Term goals:  1) AROM for the R elbow 0->= 130°. no increase in pain. (met)     2) B wrist 5/5, no increase in pain. (met)     3) R tricep 5/5.    4) R  at the #2 setting >= 80#. (partially met 72#)     6) Patient able to perform 1 arm box carry of 20# for 3 minutes with no increase in pain or onset of s/s. (met)     7) Patient able to perform pushing/pulling activities with no increase in pain or onset of s/s. (met)     8) I with final HEP (met)     Plan  Plan details: Discharge at this time with most goals met and pt desire to return to work.                    Timed:  Manual Therapy:         mins  29426;  Therapeutic Exercise:    39     mins  13772;   Aquatic Therex :        mins  78683    Neuromuscular Juan Miguel:        mins  86735;    Therapeutic Activity:          mins  74309;     Gait Training:           mins  15163;     Ultrasound:          mins  84707;    Electrical Stimulation:         mins  54158 ( );    Untimed:  Electrical Stimulation:         mins  83289 ( );  Mechanical Traction:         mins  02401;   Orthotic fit/train:         mins  81944    Timed Treatment:   39   mins   Total Treatment:     39   mins  Ghazala Martin PTA  Physical Therapist Assistant        This document has been electronically signed by Ghazala Martin PTA on May 25, 2021 13:10 CDT

## 2022-09-22 NOTE — PAT
Chlorhexidine wash and instructions given, patient voiced understanding.   [FreeTextEntry1] : Outside lab results reviewed - no action needed

## 2023-08-29 ENCOUNTER — TRANSCRIBE ORDERS (OUTPATIENT)
Dept: PHYSICAL THERAPY | Facility: HOSPITAL | Age: 38
End: 2023-08-29
Payer: MEDICAID

## 2023-08-29 DIAGNOSIS — M54.50 LOW BACK PAIN, UNSPECIFIED BACK PAIN LATERALITY, UNSPECIFIED CHRONICITY, UNSPECIFIED WHETHER SCIATICA PRESENT: Primary | ICD-10-CM

## 2023-09-07 ENCOUNTER — HOSPITAL ENCOUNTER (OUTPATIENT)
Dept: PHYSICAL THERAPY | Facility: HOSPITAL | Age: 38
Setting detail: THERAPIES SERIES
Discharge: HOME | End: 2023-09-07
Payer: MEDICAID

## 2023-09-07 DIAGNOSIS — M54.50 LOW BACK PAIN, UNSPECIFIED BACK PAIN LATERALITY, UNSPECIFIED CHRONICITY, UNSPECIFIED WHETHER SCIATICA PRESENT: Primary | ICD-10-CM

## 2023-09-07 PROCEDURE — 97162 PT EVAL MOD COMPLEX 30 MIN: CPT

## 2023-09-07 PROCEDURE — 97110 THERAPEUTIC EXERCISES: CPT

## 2023-09-07 NOTE — THERAPY EVALUATION
Outpatient Physical Therapy Ortho Initial Evaluation  [facility]     Patient Name: Bautista Lguo  : 1985  MRN: [id number]  Today's Date: 2023      Patient seen for 1 PT sessions.  Patient reports N/A% of improvement.  Next MD appt: JOVANI/prn  Recertification: 2023      Therapy Diagnosis: Acute non-specific LBP with SI joint involvement     Barriers to Rehab: Include significant or possible arthritic/degenerative changes that have occurred within the spine, BMI, The chronicity of this issue.     Safety Issues: None noted.       Visit Date: 2023    Patient Active Problem List   Diagnosis    Bilateral hand pain    Carpal tunnel syndrome, bilateral    S/P carpal tunnel release        Past Medical History:   Diagnosis Date    Carpal tunnel syndrome     Hypertension     Sleep apnea     using bipap        Past Surgical History:   Procedure Laterality Date    CARPAL TUNNEL RELEASE Right 2019    Procedure: CARPAL TUNNEL RELEASE;  Surgeon: Beau Delacruz MD;  Location: [facility];  Service: Orthopedics    CARPAL TUNNEL RELEASE Left 3/8/2019    Procedure: CARPAL TUNNEL RELEASE;  Surgeon: Beau Delacruz MD;  Location: [facility];  Service: Orthopedics    HERNIA REPAIR         Visit Dx:     ICD-10-CM ICD-9-CM   1. Low back pain, unspecified back pain laterality, unspecified chronicity, unspecified whether sciatica present  M54.50 724.2          Patient History       Row Name 23 0800             History    Chief Complaint Pain  -AC      Type of Pain Back pain  -AC      Date Current Problem(s) Began 23  -      Brief Description of Current Complaint Patient reports he fell and injured his back. Was in a [employer] and he slipped on wet floor while getting a drink. Fell flat on his back. Reports pain happened immediately. Reports pain has progressively gotten worse since then. Feels worse first in AM. X-ray revealed nothing remarkable per patient report.  "Midline low back pain that radiates to tailbone. Patient reports he started a detox a few days ago. Started working out. Reports some rectal pressure at tailbone \"like he has to poop\".  -AC      Patient/Caregiver Goals Relieve pain;Know what to do to help the symptoms  -AC      Current Tobacco Use none  -AC      Smoking Status former  -AC      Hand Dominance right-handed  -AC      Occupation/sports/leisure activities Occupation: self-employed (); Hobbies: play football, watch football  -AC      Patient seeing anyone else for problem(s)? Yes, ortho  -AC      What clinical tests have you had for this problem? X-ray  -AC      History of Previous Related Injuries none  -AC         Pain     Pain Location Back  -AC      Pain at Present 6  -AC      Pain Frequency Constant/continuous  -AC      Pain Description Throbbing  -AC      What Performance Factors Make the Current Problem(s) WORSE? lifting heavy objects, twisting to both sides, prolonged sitting  -AC      What Performance Factors Make the Current Problem(s) BETTER? ice, heat  -AC      Tolerance Time- Standing ~30 mins  -AC      Tolerance Time- Sitting 1 hour  -AC      Is your sleep disturbed? Yes  -AC      Is medication used to assist with sleep? No  -AC      What position do you sleep in? Supine  -AC      Difficulties at work? lifting heavy objects  -AC         Fall Risk Assessment    Any falls in the past year: Yes  -AC      Number of falls reported in the last 12 months 1  -AC      Factors that contributed to the fall: Lost balance;Tripped  -AC         Daily Activities    Are you able to read Yes  -AC      Are you able to write Yes  -AC                User Key  (r) = Recorded By, (t) = Taken By, (c) = Cosigned By      Initials Name Provider Type    Rebecca Castro PT Physical Therapist                     PT Ortho       Row Name 09/07/23 0800       Subjective Comments    Subjective Comments see patient history  -AC       Precautions and Contraindications "    Precautions/Limitations no known precautions/limitations  -AC       Posture/Observations    Forward Head Bilateral:;Normal  -AC    Rounded Shoulders Bilateral:;Mild;Increased;Sitting posture  -AC    Scoliosis Bilateral:;Normal  -AC    Lumbar lordosis Bilateral:;Mild;Decreased;Standing posture  flattened  -AC    Iliac crests Bilateral:;Normal  pelvis appears level, no LLD  -AC    Genu valgus Bilateral:;Normal  -AC    Posture/Observations Comments Increased hyperextension of B knees and increased hip extension in standing position. Patient ambulates into clinic with increased waddling gait and B hip ER due to increased body habitus.  -AC       DTR- Lower Quarter Clearing    Patellar tendon (L2-4) Bilateral:;2- Normal response  -AC    Achilles tendon (S1-2) Bilateral:;2- Normal response  -AC       Sensory Screen for Light Touch- Lower Quarter Clearing    L1 (inguinal area) Bilateral:;Intact  -AC    L2 (anterior mid thigh) Bilateral:;Intact  -AC    L3 (distal anterior thigh) Bilateral:;Intact  -AC    L4 (medial lower leg/foot) Bilateral:;Intact  -AC    L5 (lateral lower leg/great toe) Bilateral:;Intact  -AC    S1 (bottom of foot) Bilateral:;Intact  -AC       Lumbosacral Accessory Motions    PA Hubbard- L3 Center:;Hypomobile;Bilateral pain  -AC    PA Hubbard- L4 Center:;Hypomobile;Bilateral pain  -AC    PA Hubbard- L5 Center:;Hypomobile;Bilateral pain  -AC    PA glide- Sacral base Right:;Right pain  L on R sacral innominate  -AC    PA glide- Sacral apex Center:;Hypomobile  -AC    Innominate rotation Center:;WNL  -AC       Lumbosacral Palpation    SI Bilateral:;Tender  -AC    Lumbosacral Segment Bilateral:;Tender  -AC    Spinous Process Bilateral:;Tender  L3-L5  -AC    Erector Spinae (Paraspinals) Bilateral:;Tender;Guarded/taut  -AC       Noam's Signs    Superficial and non-anatomical tenderness Negative  -AC    Simulation test Negative  -AC    Distraction straight leg raise test (sitting vs supine) Negative  -AC     Regional disturbances Negative  -AC    Overreaction to examination Negative  -AC       General ROM    GENERAL ROM COMMENTS B LE AROM WFL, decreased ROM of B hip flexion and B hip IR/ER due to increased body habitus but still WFL.  -AC       Head/Neck/Trunk    Trunk Extension AROM 20°  increased p!  -AC    Trunk Flexion AROM 70°, finger tip bryan mid shin '  -AC    Trunk Lt Lateral Flexion AROM 100% of full range  -AC    Trunk Rt Lateral Flexion AROM 100% of full range  -AC    Trunk Lt Rotation AROM 75% of full range  -AC    Trunk Rt Rotation AROM 75% of full range  -AC       MMT Right Lower Ext    Rt Hip Flexion MMT, Gross Movement (4/5) good  -AC    Rt Hip Extension MMT, Gross Movement (4-/5) good minus  -AC    Rt Hip ABduction MMT, Gross Movement (4/5) good  -AC    Rt Hip ADduction MMT, Gross Movement (4/5) good  -AC    Rt Hip Internal (Medial) Rotation MMT, Gross Movement (4-/5) good minus  -AC    Rt Hip External (Lateral) Rotation MMT, Gross Movement (4-/5) good minus  -AC    Rt Knee Extension MMT, Gross Movement (4+/5) good plus  -AC    Rt Knee Flexion MMT, Gross Movement (4+/5) good plus  -AC    Rt Ankle Plantarflexion MMT, Gross Movement (4+/5) good plus  -AC    Rt Ankle Dorsiflexion MMT, Gross Movement (4+/5) good plus  -AC       MMT Left Lower Ext    Lt Hip Flexion MMT, Gross Movement (4/5) good  -AC    Lt Hip Extension MMT, Gross Movement (4/5) good  -AC    Lt Hip ABduction MMT, Gross Movement (4/5) good  -AC    Lt Hip ADduction MMT, Gross Movement (4/5) good  -AC    Lt Hip Internal (Medial) Rotation MMT, Gross Movement (4/5) good  -AC    Lt Hip External (Lateral) Rotation MMT, Gross Movement (4/5) good  -AC    Lt Knee Extension MMT, Gross Movement (4/5) good  -AC    Lt Knee Flexion MMT, Gross Movement (4+/5) good plus  -AC    Lt Ankle Plantarflexion MMT, Gross Movement (4+/5) good plus  -AC    Lt Ankle Dorsiflexion MMT, Gross Movement (4+/5) good plus  -AC       Sensation    Sensation WNL? WNL  -AC    Light  Touch No apparent deficits  -AC    Sharp/Dull No apparent deficits  -AC       Lower Extremity Flexibility    Hamstrings Bilateral:;Mildly limited  -AC    Hip Flexors Bilateral:;Mildly limited  -AC    Hip External Rotators Left:;Mildly limited;Right:;Moderately limited  -AC       Pathomechanics    Spine Pathomechanics Hinges into extension at one segment in lumbar;Bends knees with attempted lumbar extension  -AC       Transfers    Comment, (Transfers) I with all transfers. Poor log roll technique.  -AC       Gait/Stairs (Locomotion)    Comment, (Gait/Stairs) I with ambulation into clinic with no AD. Increased B Hip ER and out toeing noted due to increased body habitus.  -AC              User Key  (r) = Recorded By, (t) = Taken By, (c) = Cosigned By      Initials Name Provider Type    AC Rebecca Rubio, PT Physical Therapist                                Therapy Education  Education Details: HEP: all exercises provided this visit; ed on evaluation findings and log roll technique  Given: HEP, Pain management, Symptoms/condition management, Posture/body mechanics, Other (comment) (POC)  Program: New  How Provided: Verbal, Demonstration, Written  Provided to: Patient  Level of Understanding: Teach back education performed, Verbalized, Demonstrated      PT OP Goals       Row Name 09/07/23 0800          PT Short Term Goals    STG Date to Achieve 09/28/23  -     STG 1 Patient to be I with HEP with additions/changes prior to next recertification.  -     STG 2 Patient to perform proper log roll technique with no cues from PT or increase in pain.  -     STG 3 Patient to perform 20 sit to/from stand </= 1 UE A with gentle lumbar extension with good form and no increase in pain.  -     STG 4 Trunk flexion AROM >/= 80°.  -     STG 5 B LE strength >/= 4+/5.  -        Long Term Goals    LTG 1 Patient to be I with final HEP and self management of s/s.  -     LTG 2 Patient to demo proper lifting mechanics with >/=  "30-50# weighted crate with no increase in pain to simulate work environment.  -     LTG 3 Patient to participate and verbalize understanding of household ergonomics with no cues from PT.  -     LTG 4 Patient to perform 20 bridges with UE \"X\" position with good form and no increase in pain.  -AC     LTG 5 B LE strength 5/5.  -AC     LTG 6 All trunk AROM WNL, with no increase in pain.  -     LTG 7 Patient to have full alignment of sacrum for 3 consecutive visits.  -AC        Time Calculation    PT Goal Re-Cert Due Date 09/28/23  -               User Key  (r) = Recorded By, (t) = Taken By, (c) = Cosigned By      Initials Name Provider Type     Rebecca Rubio, PT Physical Therapist                     PT Assessment/Plan       Row Name 09/07/23 0900          PT Assessment    Functional Limitations Impaired gait;Limitations in community activities;Performance in leisure activities;Performance in work activities  -     Impairments Gait;Impaired flexibility;Posture;Pain;Muscle strength;Range of motion;Joint mobility;Impaired muscle endurance;Impaired muscle power  -     Assessment Comments Patient is a 37 y.o. male who presents to PT with acute LBP from a recent fall in 08/23. Patient presents with decreased trunk AROM, B LE strength, flexibiility, dynamic pelvis/trunk stability, posture, and alignment of sacrum. Patient would benefit from skilled PT to address deficits listed above.  -AC     Please refer to paper survey for additional self-reported information Yes  -AC     Rehab Potential Good  -AC     Patient/caregiver participated in establishment of treatment plan and goals Yes  -AC     Patient would benefit from skilled therapy intervention Yes  -AC        PT Plan    PT Frequency 2x/week  -AC     Predicted Duration of Therapy Intervention (PT) 9-10 visits  -AC     Planned CPT's? PT EVAL MOD COMPLELITY: 96374;PT RE-EVAL: 64589;PT THER PROC EA 15 MIN: 19413;PT MANUAL THERAPY EA 15 MIN: 09364;PT THER ACT " "EA 15 MIN: 38868;PT NEUROMUSC RE-EDUCATION EA 15 MIN: 19590;PT GAIT TRAINING EA 15 MIN: 55316;PT SELF CARE/HOME MGMT/TRAIN EA 15: 05939;PT HOT OR COLD PACK TREAT [facility];PT THER SUPP EA 15 MIN;PT SELF CARE/MGMT/TRAIN 15 MIN: 01627  -AC     PT Plan Comments Progress overall trunk AROM, dynamic trunk./pelvis stability, sacral alignment, B LE strength, posture, and flexibility.  -AC               User Key  (r) = Recorded By, (t) = Taken By, (c) = Cosigned By      Initials Name Provider Type    AC Rebecca Rubio PT Physical Therapist                       OP Exercises       Row Name 09/07/23 0800             Subjective Comments    Subjective Comments see patient history  -AC         Subjective Pain    Pre-Treatment Pain Level 6  -AC      Post-Treatment Pain Level 6  -AC         Exercise 1    Exercise Name 1 prone HS curls  -AC      Sets 1 1  -AC      Reps 1 10  -AC      Time 1 5 sec ecc lowering  -AC         Exercise 2    Exercise Name 2 Prone heel squeezes  -AC      Sets 2 1  -AC      Reps 2 10  -AC      Time 2 5\" hold  -AC         Exercise 3    Exercise Name 3 Sacral Towel S: H/V  -AC      Time 3 1 mins each  -AC         Exercise 4    Exercise Name 4 LTR  -AC      Reps 4 10  -AC      Time 4 10\" hold  -AC         Exercise 5    Exercise Name 5 B st. HS stretch  -AC      Reps 5 2  -AC      Time 5 30 sec hold  -AC         Exercise 6    Exercise Name 6 Patient education  -AC      Additional Comments see flowsheet  -AC                User Key  (r) = Recorded By, (t) = Taken By, (c) = Cosigned By      Initials Name Provider Type    Rebecca Castro PT Physical Therapist                                  Outcome Measure Options: Modified Oswestry  Modified Oswestry  Modified Oswestry Score/Comments: 17/50 = 34%      Time Calculation:     Start Time: 0827  Stop Time: 0917  Time Calculation (min): 50 min  Total Timed Code Minutes- PT: 23 minute(s)     Therapy Charges for Today       Code Description Service Date Service " Provider Modifiers Qty    98326104685 HC PT THER SUPP EA 15 MIN 9/7/2023 Garrett, Autumn, PT GP 1    32737106154 HC PT EVAL MOD COMPLEXITY 2 9/7/2023 Garrett, Autumn, PT GP 1    72717134322 HC PT THER PROC EA 15 MIN 9/7/2023 Copeland, Autumn, PT GP 1            PT G-Codes  Outcome Measure Options: Modified Oswestry  Modified Oswestry Score/Comments: 17/50 = 34%         Rebecca Rubio, PT, DPT  9/7/2023

## 2023-09-11 ENCOUNTER — TELEPHONE (OUTPATIENT)
Dept: PHYSICAL THERAPY | Facility: HOSPITAL | Age: 38
End: 2023-09-11
Payer: MEDICAID

## 2023-09-11 DIAGNOSIS — M54.50 LOW BACK PAIN, UNSPECIFIED BACK PAIN LATERALITY, UNSPECIFIED CHRONICITY, UNSPECIFIED WHETHER SCIATICA PRESENT: Primary | ICD-10-CM

## 2023-09-11 NOTE — TELEPHONE ENCOUNTER
PT attempt to call patient. No answer. PT left voicemail with details for next upcoming appointment and provided front office number for rescheduling.

## 2023-09-14 ENCOUNTER — HOSPITAL ENCOUNTER (OUTPATIENT)
Dept: PHYSICAL THERAPY | Facility: HOSPITAL | Age: 38
Setting detail: THERAPIES SERIES
Discharge: HOME | End: 2023-09-14
Payer: MEDICAID

## 2023-09-14 DIAGNOSIS — M54.50 LOW BACK PAIN, UNSPECIFIED BACK PAIN LATERALITY, UNSPECIFIED CHRONICITY, UNSPECIFIED WHETHER SCIATICA PRESENT: Primary | ICD-10-CM

## 2023-09-14 PROCEDURE — 97110 THERAPEUTIC EXERCISES: CPT

## 2023-09-14 NOTE — THERAPY TREATMENT NOTE
Outpatient Physical Therapy Ortho Treatment Note  [facility]     Patient Name: Bautista Lugo  : 1985  MRN: [id number]  Today's Date: 2023    Pt seen for 2 PT sessions  Reported Improvement:  N/A %  MD Visit: TBD/prn  Recheck Date: 2023    Therapy Diagnosis:  Acute non-specific LBP with SI joint involvement          Visit Date: 2023    Visit Dx:    ICD-10-CM ICD-9-CM   1. Low back pain, unspecified back pain laterality, unspecified chronicity, unspecified whether sciatica present  M54.50 724.2       Patient Active Problem List   Diagnosis    Bilateral hand pain    Carpal tunnel syndrome, bilateral    S/P carpal tunnel release        Past Medical History:   Diagnosis Date    Carpal tunnel syndrome     Hypertension     Sleep apnea     using bipap        Past Surgical History:   Procedure Laterality Date    CARPAL TUNNEL RELEASE Right 2019    Procedure: CARPAL TUNNEL RELEASE;  Surgeon: Beau Delacruz MD;  Location: [facility];  Service: Orthopedics    CARPAL TUNNEL RELEASE Left 3/8/2019    Procedure: CARPAL TUNNEL RELEASE;  Surgeon: Beau Delacruz MD;  Location: [facility];  Service: Orthopedics    HERNIA REPAIR          PT Ortho       Row Name 23 0800       Precautions and Contraindications    Precautions/Limitations no known precautions/limitations  -MICKEY              User Key  (r) = Recorded By, (t) = Taken By, (c) = Cosigned By      Initials Name Provider Type    Ghazala Santos PTA Physical Therapist Assistant                                 PT Assessment/Plan       Row Name 23 0800          PT Assessment    Assessment Comments Pt states his back is a constant pain.  States he has been doing the stretches at home and it does seem to help a little bit.  Pt with good effort with all therex this date.  -MICKEY        PT Plan    PT Frequency 2x/week  -MICKEY     PT Plan Comments Next visit progress to [facility] , add pball roll outs  -MICKEY               User  "Upper Respiratory Infection, Adult  Most upper respiratory infections (URIs) are caused by a virus. A URI affects the nose, throat, and upper air passages. The most common type of URI is often called \"the common cold.\"  HOME CARE   · Take medicines only as told by your doctor.  · Gargle warm saltwater or take cough drops to comfort your throat as told by your doctor.  · Use a warm mist humidifier or inhale steam from a shower to increase air moisture. This may make it easier to breathe.  · Drink enough fluid to keep your pee (urine) clear or pale yellow.  · Eat soups and other clear broths.  · Have a healthy diet.  · Rest as needed.  · Go back to work when your fever is gone or your doctor says it is okay.  ¨ You may need to stay home longer to avoid giving your URI to others.  ¨ You can also wear a face mask and wash your hands often to prevent spread of the virus.  · Use your inhaler more if you have asthma.  · Do not use any tobacco products, including cigarettes, chewing tobacco, or electronic cigarettes. If you need help quitting, ask your doctor.  GET HELP IF:  · You are getting worse, not better.  · Your symptoms are not helped by medicine.  · You have chills.  · You are getting more short of breath.  · You have brown or red mucus.  · You have yellow or brown discharge from your nose.  · You have pain in your face, especially when you bend forward.  · You have a fever.  · You have puffy (swollen) neck glands.  · You have pain while swallowing.  · You have white areas in the back of your throat.  GET HELP RIGHT AWAY IF:   · You have very bad or constant:    Headache.    Ear pain.    Pain in your forehead, behind your eyes, and over your cheekbones (sinus pain).    Chest pain.  · You have long-lasting (chronic) lung disease and any of the following:    Wheezing.    Long-lasting cough.    Coughing up blood.    A change in your usual mucus.  · You have a stiff neck.  · You have changes in your:    Vision.   "  Hearing.    Thinking.    Mood.  MAKE SURE YOU:   · Understand these instructions.  · Will watch your condition.  · Will get help right away if you are not doing well or get worse.     This information is not intended to replace advice given to you by your health care provider. Make sure you discuss any questions you have with your health care provider.     Document Released: 06/05/2009 Document Revised: 05/03/2016 Document Reviewed: 03/25/2015  [employer] Patient Education ©2017 [employer]  Sinusitis, Adult  Sinusitis is soreness and inflammation of your sinuses. Sinuses are hollow spaces in the bones around your face. They are located:  · Around your eyes.  · In the middle of your forehead.  · Behind your nose.  · In your cheekbones.  Your sinuses and nasal passages are lined with a stringy fluid (mucus). Mucus normally drains out of your sinuses. When your nasal tissues get inflamed or swollen, the mucus can get trapped or blocked so air cannot flow through your sinuses. This lets bacteria, viruses, and funguses grow, and that leads to infection.  HOME CARE  Medicines  · Take, use, or apply over-the-counter and prescription medicines only as told by your doctor. These may include nasal sprays.  · If you were prescribed an antibiotic medicine, take it as told by your doctor. Do not stop taking the antibiotic even if you start to feel better.  Hydrate and Humidify  · Drink enough water to keep your pee (urine) clear or pale yellow.  · Use a cool mist humidifier to keep the humidity level in your home above 50%.  · Breathe in steam for 10-15 minutes, 3-4 times a day or as told by your doctor. You can do this in the bathroom while a hot shower is running.  · Try not to spend time in cool or dry air.  Rest  · Rest as much as possible.    · Sleep with your head raised (elevated).  · Make sure to get enough sleep each night.  General Instructions  · Put a warm, moist washcloth on your face 3-4 times a day  "Key  (r) = Recorded By, (t) = Taken By, (c) = Cosigned By      Initials Name Provider Type    Ghazala Santos PTA Physical Therapist Assistant                       OP Exercises       Row Name 09/14/23 0800             Subjective    Subjective Comments Pain is just a constant.  Might be a little better learning the stretches.  -JW         Subjective Pain    Able to rate subjective pain? yes  -JW      Pre-Treatment Pain Level 6  -JW      Post-Treatment Pain Level 6  -JW         Exercise 1    Exercise Name 1 Pro II LE bike for strength and endurance  -JW      Time 1 10 min  -JW      Additional Comments Lv 4.0  -JW         Exercise 2    Exercise Name 2 B st HS st  -JW      Reps 2 2  -JW      Time 2 30  -JW         Exercise 3    Exercise Name 3 B seated piriformis st  -JW      Reps 3 2  -JW      Time 3 30  -JW         Exercise 4    Exercise Name 4 Sit to stand with lumbar extension  -JW      Sets 4 1  -JW      Reps 4 10  -JW      Time 4 5\" hold in extension  -JW         Exercise 5    Exercise Name 5 LTR  -JW      Reps 5 10  -JW      Time 5 10\" hold  -JW         Exercise 6    Exercise Name 6 PPT  -JW      Sets 6 2  -JW      Reps 6 10  -JW      Time 6 5\" hold  -JW         Exercise 7    Exercise Name 7 Verbal review of towel roll st  -JW         Exercise 8    Exercise Name 8 HL Hip ADD  -JW      Reps 8 20  -JW      Time 8 5\" hold  -JW         Exercise 9    Exercise Name 9 HL tband Hip ABD  -JW      Reps 9 20  -JW      Time 9 5\" hold  -JW      Additional Comments GTB  -JW         Exercise 10    Exercise Name 10 Log roll review  -JW         Exercise 11    Exercise Name 11 HEP  -JW                User Key  (r) = Recorded By, (t) = Taken By, (c) = Cosigned By      Initials Name Provider Type    Ghazala Santos PTA Physical Therapist Assistant                                  PT OP Goals       Row Name 09/14/23 0800          PT Short Term Goals    STG Date to Achieve 09/28/23  -JW     STG 1 Patient to be I with HEP with " "additions/changes prior to next recertification.  -     STG 1 Progress Ongoing  -     STG 2 Patient to perform proper log roll technique with no cues from PT or increase in pain.  -     STG 2 Progress Ongoing  -     STG 3 Patient to perform 20 sit to/from stand </= 1 UE A with gentle lumbar extension with good form and no increase in pain.  -     STG 3 Progress Ongoing  -     STG 4 Trunk flexion AROM >/= 80°.  -     STG 5 B LE strength >/= 4+/5.  -        Long Term Goals    LTG 1 Patient to be I with final HEP and self management of s/s.  -     LTG 2 Patient to demo proper lifting mechanics with >/= 30-50# weighted crate with no increase in pain to simulate work environment.  -     LTG 3 Patient to participate and verbalize understanding of household ergonomics with no cues from PT.  -     LTG 4 Patient to perform 20 bridges with UE \"X\" position with good form and no increase in pain.  -     LTG 5 B LE strength 5/5.  -     LTG 6 All trunk AROM WNL, with no increase in pain.  -     LTG 7 Patient to have full alignment of sacrum for 3 consecutive visits.  -        Time Calculation    PT Goal Re-Cert Due Date 09/28/23  -               User Key  (r) = Recorded By, (t) = Taken By, (c) = Cosigned By      Initials Name Provider Type    Ghazala Santos PTA Physical Therapist Assistant                    Therapy Education  Education Details: hip ABD/ADD, piriformis st, PPT  Given: HEP, Symptoms/condition management, Pain management  Program: New, Reinforced  How Provided: Verbal, Demonstration, Written  Provided to: Patient  Level of Understanding: Teach back education performed, Verbalized, Demonstrated              Time Calculation:   Start Time: 0823  Stop Time: 0911  Time Calculation (min): 48 min  Therapy Charges for Today       Code Description Service Date Service Provider Modifiers Qty    86073416061  PT THER PROC EA 15 MIN 9/14/2023 Ghazala Martin PTA GP, CQ 3    [id number]  " or as told by your doctor. This will help with discomfort.  · Wash your hands often with soap and water. If there is no soap and water, use hand .  · Do not smoke. Avoid being around people who are smoking (secondhand smoke).  · Keep all follow-up visits as told by your doctor. This is important.  GET HELP IF:  · You have a fever.  · Your symptoms get worse.  · Your symptoms do not get better within 10 days.  GET HELP RIGHT AWAY IF:  · You have a very bad headache.  · You cannot stop throwing up (vomiting).  · You have pain or swelling around your face or eyes.  · You have trouble seeing.  · You feel confused.  · Your neck is stiff.  · You have trouble breathing.     This information is not intended to replace advice given to you by your health care provider. Make sure you discuss any questions you have with your health care provider.     Document Released: 06/05/2009 Document Revised: 04/10/2017 Document Reviewed: 10/12/2016  [employer] Patient Education ©2017 [employer]    Medications and plan of care reviewed with patient and teaching done on medication reactions/side effects.  Rest, plenty of fluids, comfort measures, humidifier, warm salt water gargles, and follow up with PCP if symptoms persist.  If worse go to urgent care or ER as discussed.     PT THER SUPP EA 15 MIN 9/14/2023 Ghazala Martin, KIKE GP, CQ 1                      Ghazala Martin, KIKE  9/14/2023

## 2023-09-18 ENCOUNTER — APPOINTMENT (OUTPATIENT)
Dept: PHYSICAL THERAPY | Facility: HOSPITAL | Age: 38
End: 2023-09-18
Payer: MEDICAID

## 2023-09-21 ENCOUNTER — HOSPITAL ENCOUNTER (OUTPATIENT)
Dept: PHYSICAL THERAPY | Facility: HOSPITAL | Age: 38
Setting detail: THERAPIES SERIES
Discharge: HOME | End: 2023-09-21
Payer: MEDICAID

## 2023-09-21 DIAGNOSIS — M54.50 LOW BACK PAIN, UNSPECIFIED BACK PAIN LATERALITY, UNSPECIFIED CHRONICITY, UNSPECIFIED WHETHER SCIATICA PRESENT: Primary | ICD-10-CM

## 2023-09-21 PROCEDURE — 97110 THERAPEUTIC EXERCISES: CPT

## 2023-09-21 NOTE — THERAPY TREATMENT NOTE
Outpatient Physical Therapy Ortho Treatment Note  [facility]     Patient Name: Bautista Lugo  : 1985  MRN: [id number]  Today's Date: 2023      Pt seen for 3 PT sessions  Reported Improvement:  N/A %  MD Visit: TBD/prn  Recheck Date: 2023     Therapy Diagnosis:  Acute non-specific LBP with SI joint involvement                              Visit Date: 2023    Visit Dx:    ICD-10-CM ICD-9-CM   1. Low back pain, unspecified back pain laterality, unspecified chronicity, unspecified whether sciatica present  M54.50 724.2       Patient Active Problem List   Diagnosis    Bilateral hand pain    Carpal tunnel syndrome, bilateral    S/P carpal tunnel release        Past Medical History:   Diagnosis Date    Carpal tunnel syndrome     Hypertension     Sleep apnea     using bipap        Past Surgical History:   Procedure Laterality Date    CARPAL TUNNEL RELEASE Right 2019    Procedure: CARPAL TUNNEL RELEASE;  Surgeon: Beau Delacruz MD;  Location: [facility];  Service: Orthopedics    CARPAL TUNNEL RELEASE Left 3/8/2019    Procedure: CARPAL TUNNEL RELEASE;  Surgeon: Beau Delacruz MD;  Location: [facility];  Service: Orthopedics    HERNIA REPAIR          PT Ortho       Row Name 23 0800       Precautions and Contraindications    Precautions/Limitations no known precautions/limitations  -AC              User Key  (r) = Recorded By, (t) = Taken By, (c) = Cosigned By      Initials Name Provider Type    Rebecca Castro, PT Physical Therapist                                 PT Assessment/Plan       Row Name 23 0800          PT Assessment    Assessment Comments Sacral alignment reassessed this visit which appears to be level. Patient tolerated prone therEx well with no increase in pain. Reduction of stiffness/tightness reported by patient at end of PT session. Pt gives good recall of current HEP. Posterior chain strengthening activities added this visit with  "good patient tolerance.  -AC        PT Plan    PT Frequency 2x/week  -AC     PT Plan Comments Continue to monitor alignment. add prone TKE with GS and revisit PPT. May add seated DD activities in future. Update HEP.  -AC               User Key  (r) = Recorded By, (t) = Taken By, (c) = Cosigned By      Initials Name Provider Type     RubioRebecca, PT Physical Therapist                       OP Exercises       Row Name 09/21/23 0800             Subjective    Subjective Comments Pt reports each day his back seems to feel a little better. States he has kept doing his sacral towel S.  -AC         Subjective Pain    Able to rate subjective pain? yes  -AC      Pre-Treatment Pain Level 5  -AC      Post-Treatment Pain Level 5  feels looser  -AC         Exercise 1    Exercise Name 1 Pro II LE bike for strength and endurance  -AC      Time 1 10 min  -AC      Additional Comments L 4.5  -AC         Exercise 2    Exercise Name 2 B st HS st  -AC      Reps 2 2  -AC      Time 2 30  -AC         Exercise 3    Exercise Name 3 B seated piriformis st  -AC      Reps 3 2  -AC      Time 3 30  -AC         Exercise 4    Exercise Name 4 Sit to stand with lumbar extension  -AC      Sets 4 2  -AC      Reps 4 10  -AC      Time 4 5\" hold in extension  -AC         Exercise 5    Exercise Name 5 LTR  -AC      Reps 5 10  -AC      Time 5 10\" hold  -AC         Exercise 6    Exercise Name 6 Sacral Towel S: H/V  -AC      Time 6 2 minutes each  -AC         Exercise 7    Exercise Name 7 prone heel squeeze  -AC      Sets 7 2  -AC      Reps 7 10  -AC         Exercise 8    Exercise Name 8 prone HS curls  -AC      Sets 8 1  -AC      Reps 8 10  -AC      Time 8 5 sec ecc lowering  -AC         Exercise 9    Exercise Name 9 Bridge  -AC      Sets 9 1  -AC      Reps 9 10  -AC      Time 9 5\" hold  -AC      Additional Comments with GS  -AC         Exercise 10    Exercise Name 10 Alignment assessment  -AC      Additional Comments sacrum appears level  -AC        " "        User Key  (r) = Recorded By, (t) = Taken By, (c) = Cosigned By      Initials Name Provider Type     Rebecca Rubio, PT Physical Therapist                                  PT OP Goals       Row Name 09/21/23 0800          PT Short Term Goals    STG Date to Achieve 09/28/23  -     STG 1 Patient to be I with HEP with additions/changes prior to next recertification.  -     STG 1 Progress Ongoing  -     STG 2 Patient to perform proper log roll technique with no cues from PT or increase in pain.  -     STG 2 Progress Ongoing  -     STG 3 Patient to perform 20 sit to/from stand </= 1 UE A with gentle lumbar extension with good form and no increase in pain.  -     STG 3 Progress Ongoing  -     STG 4 Trunk flexion AROM >/= 80°.  -     STG 5 B LE strength >/= 4+/5.  -        Long Term Goals    LTG 1 Patient to be I with final HEP and self management of s/s.  -     LTG 2 Patient to demo proper lifting mechanics with >/= 30-50# weighted crate with no increase in pain to simulate work environment.  -     LTG 3 Patient to participate and verbalize understanding of household ergonomics with no cues from PT.  -     LTG 4 Patient to perform 20 bridges with UE \"X\" position with good form and no increase in pain.  -     LTG 5 B LE strength 5/5.  -     LTG 6 All trunk AROM WNL, with no increase in pain.  -     LTG 7 Patient to have full alignment of sacrum for 3 consecutive visits.  -        Time Calculation    PT Goal Re-Cert Due Date 09/28/23  -               User Key  (r) = Recorded By, (t) = Taken By, (c) = Cosigned By      Initials Name Provider Type    Rebecca Castro PT Physical Therapist                    Therapy Education  Given: HEP, Symptoms/condition management  Program: Reinforced  How Provided: Verbal  Provided to: Patient  Level of Understanding: Verbalized              Time Calculation:   Start Time: 0828  Stop Time: 0910  Time Calculation (min): 42 min  Therapy " Charges for Today       Code Description Service Date Service Provider Modifiers Qty    02920763038 HC PT THER SUPP EA 15 MIN 9/21/2023 Rebecca Rubio, PT GP 1    [id number] HC PT THER PROC EA 15 MIN 9/21/2023 Rebecca Rubio, PT GP 3                      Rebecca NICOLE Rubio, DPT  9/21/2023

## (undated) DEVICE — GLV SURG TRIUMPH LT PF LTX 7 STRL

## (undated) DEVICE — GLV SURG TRIUMPH LT PF LTX 6 STRL

## (undated) DEVICE — STERILE POLYISOPRENE POWDER-FREE SURGICAL GLOVES WITH EMOLLIENT COATING: Brand: PROTEXIS

## (undated) DEVICE — GLV SURG TRIUMPH LT PF LTX 6.5 STRL

## (undated) DEVICE — SOL IRR NACL 0.9PCT BT 1000ML

## (undated) DEVICE — SYR LL TP 10ML STRL

## (undated) DEVICE — SPLNT PLSTR CAST FAST 3IN

## (undated) DEVICE — GLV SURG ORTHO BIOGEL OPTIFIT PF SZ9

## (undated) DEVICE — SPNG GZ WOVN 4X4IN 12PLY 10/BX STRL

## (undated) DEVICE — GLV SURG SENSICARE POLYISPRN W/ALOE PF LF 6 GRN STRL

## (undated) DEVICE — PAD UNDERCAST WYTEX 4IN 4YD LF STRL

## (undated) DEVICE — NDL HYPO ECLPS SFTY 25G 1 1/2IN

## (undated) DEVICE — DRSNG GZ CURAD XEROFORM NONADHS 5X9IN STRL

## (undated) DEVICE — DISPOSABLE TOURNIQUET CUFF SINGLE BLADDER, DUAL PORT AND QUICK CONNECT CONNECTOR: Brand: COLOR CUFF

## (undated) DEVICE — GOWN,PREVENTION PLUS,XLONG/XLARGE,STRL: Brand: MEDLINE

## (undated) DEVICE — SOL ANTISEP SCRB PVPI 7.5PCT 4OZ

## (undated) DEVICE — BLD SCLPL BEAVR MINI STR 2BVL 180D LF

## (undated) DEVICE — GOWN,AURORA,NOREINF,RAGLAN,XL,STERILE: Brand: MEDLINE

## (undated) DEVICE — GLV SURG SENSICARE PI PF LF 7 GRN STRL

## (undated) DEVICE — GAUZE,SPONGE,FLUFF,6"X6.75",STRL,5/TRAY: Brand: MEDLINE

## (undated) DEVICE — GLV SURG SENSICARE POLYISPRN W/ALOE PF LF 6.5 GRN STRL

## (undated) DEVICE — UNDRPD BREATH 23X36 BG/10

## (undated) DEVICE — PK EXTRM LF 60

## (undated) DEVICE — PREP SOL POVIDONE/IODINE BT 4OZ

## (undated) DEVICE — SUT ETHLN 4/0 FS2 18IN 662H

## (undated) DEVICE — TRY IRR